# Patient Record
Sex: MALE | Race: WHITE
[De-identification: names, ages, dates, MRNs, and addresses within clinical notes are randomized per-mention and may not be internally consistent; named-entity substitution may affect disease eponyms.]

---

## 2019-08-27 ENCOUNTER — HOSPITAL ENCOUNTER (EMERGENCY)
Dept: HOSPITAL 97 - ER | Age: 69
Discharge: HOME | End: 2019-08-27
Payer: COMMERCIAL

## 2019-08-27 DIAGNOSIS — Z23: ICD-10-CM

## 2019-08-27 DIAGNOSIS — Z85.028: ICD-10-CM
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
## 2019-08-27 DIAGNOSIS — S81.811A: Primary | ICD-10-CM
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
## 2019-08-27 DIAGNOSIS — I10: ICD-10-CM

## 2019-08-27 PROCEDURE — 90471 IMMUNIZATION ADMIN: CPT

## 2019-08-27 PROCEDURE — 0JQN0ZZ REPAIR RIGHT LOWER LEG SUBCUTANEOUS TISSUE AND FASCIA, OPEN APPROACH: ICD-10-PCS

## 2019-08-27 PROCEDURE — 90714 TD VACC NO PRESV 7 YRS+ IM: CPT

## 2019-08-27 PROCEDURE — 99284 EMERGENCY DEPT VISIT MOD MDM: CPT

## 2019-08-27 NOTE — EDPHYS
Physician Documentation                                                                           

 [facility]                                                                 

Name: Fabio Matute Jr                                                                                

Age: 69 yrs                                                                                       

Sex: Male                                                                                         

: 1950                                                                                   

MRN: [id number]                                                                                   

Arrival Date: 2019                                                                          

Time: 12:17                                                                                       

Account#: [id number]                                                                            

Bed Treatment                                                                                     

Private MD: Laz Odonnell B                                                                     

ED Physician Chapin Carver                                                                      

HPI:                                                                                              

                                                                                             

13:37 This 69 yrs old  Male presents to ER via Wheelchair with complaints of         malissa 

      Laceration To Leg.                                                                          

13:37 The patient has a laceration related to: handling garbage, from a sharp metal object,   malissa 

      occurred at home. The laceration(s) is(are) located on the lateral aspect of right          

      calf. Onset: The symptoms/episode began/occurred just prior to arrival. The patient has     

      not experienced similar symptoms in the past.                                               

                                                                                                  

Historical:                                                                                       

- Allergies:                                                                                      

12:21 No Known Allergies;                                                                     la1 

- PMHx:                                                                                           

12:21 Arthritis; Hypertension; stomach cancer;                                                la1 

                                                                                                  

- Immunization history:: Adult Immunizations up to date.                                          

- Social history:: Smoking status: Patient/guardian denies using tobacco.                         

- Ebola Screening: : No symptoms or risks identified at this time.                                

- Family history:: not pertinent.                                                                 

                                                                                                  

                                                                                                  

ROS:                                                                                              

13:37 Constitutional: Negative for fever, chills, and weight loss, Eyes: Negative for injury, malissa 

      pain, redness, and discharge, ENT: Negative for injury, pain, and discharge, Neck:          

      Negative for injury, pain, and swelling, Cardiovascular: Negative for chest pain,           

      palpitations, and edema, Respiratory: Negative for shortness of breath, cough,              

      wheezing, and pleuritic chest pain, Abdomen/GI: Negative for abdominal pain, nausea,        

      vomiting, diarrhea, and constipation, Back: Negative for injury and pain, : Negative      

      for injury, bleeding, discharge, and swelling, Skin: Negative for injury, rash, and         

      discoloration, Neuro: Negative for headache, weakness, numbness, tingling, and seizure,     

      Psych: Negative for depression, anxiety, suicide ideation, homicidal ideation, and          

      hallucinations, Allergy/Immunology: Negative for hives, rash, and allergies, Endocrine:     

      Negative for neck swelling, polydipsia, polyuria, polyphagia, and marked weight             

      changes, Hematologic/Lymphatic: Negative for swollen nodes, abnormal bleeding, and          

      unusual bruising.                                                                           

13:37 MS/extremity: Positive for pain, swelling, tenderness, of the lateral aspect of right       

      calf.                                                                                       

                                                                                                  

Exam:                                                                                             

13:37 Constitutional:  This is a well developed, well nourished patient who is awake, alert,  malissa 

      and in no acute distress. Head/Face:  Normocephalic, atraumatic. Eyes:  Pupils equal        

      round and reactive to light, extra-ocular motions intact.  Lids and lashes normal.          

      Conjunctiva and sclera are non-icteric and not injected.  Cornea within normal limits.      

      Periorbital areas with no swelling, redness, or edema. ENT:  Nares patent. No nasal         

      discharge, no septal abnormalities noted.  Tympanic membranes are normal and external       

      auditory canals are clear.  Oropharynx with no redness, swelling, or masses, exudates,      

      or evidence of obstruction, uvula midline.  Mucous membranes moist. Neck:  Trachea          

      midline, no thyromegaly or masses palpated, and no cervical lymphadenopathy.  Supple,       

      full range of motion without nuchal rigidity, or vertebral point tenderness.  No            

      Meningismus. Chest/axilla:  Normal chest wall appearance and motion.  Nontender with no     

      deformity.  No lesions are appreciated. Cardiovascular:  Regular rate and rhythm with a     

      normal S1 and S2.  No gallops, murmurs, or rubs.  Normal PMI, no JVD.  No pulse             

      deficits. Respiratory:  Lungs have equal breath sounds bilaterally, clear to                

      auscultation and percussion.  No rales, rhonchi or wheezes noted.  No increased work of     

      breathing, no retractions or nasal flaring. Abdomen/GI:  Soft, non-tender, with normal      

      bowel sounds.  No distension or tympany.  No guarding or rebound.  No evidence of           

      tenderness throughout. Back:  No spinal tenderness.  No costovertebral tenderness.          

      Full range of motion. Male :  Normal genitalia with no discharge or lesions. Skin:        

      Warm, dry with normal turgor.  Normal color with no rashes, no lesions, and no evidence     

      of cellulitis. Neuro:  Awake and alert, GCS 15, oriented to person, place, time, and        

      situation.  Cranial nerves II-XII grossly intact.  Motor strength 5/5 in all                

      extremities.  Sensory grossly intact.  Cerebellar exam normal.  Normal gait. Psych:         

      Awake, alert, with orientation to person, place and time.  Behavior, mood, and affect       

      are within normal limits.                                                                   

13:37 Musculoskeletal/extremity: Extremities: laceration, ROM: no acute changes, intact in        

      all extremities, full active range of motion, full passive range of motion, Circulation     

      is intact in all extremities. Pulses: are normal with no appreciated deficits,              

      Sensation intact. Compartment Syndrome exam of affected extremity: is normal.               

                                                                                                  

Vital Signs:                                                                                      

12:21  / 83; Pulse 100; Resp 16; Temp 98.4; Pulse Ox 98% on R/A; Weight 79.38 kg;       la1 

      Height 5 ft. 7 in. (170.18 cm);                                                             

12:21 Body Mass Index 27.41 (79.38 kg, 170.18 cm)                                             [facility] 

                                                                                                  

Laceration:                                                                                       

13:40 Wound Repair of 2.5cm ( 1.0in ) subcutaneous laceration to right leg. Linear shaped..   [facility] 

      Distal neuro/vascular/tendon intact. Anesthesia: Local anesthetic administered with 5       

      mls of 1% lidocaine. Wound prep: Moderate cleansing by me. Skin closed with 2 5-0           

      Prolene using vertical mattress sutures and sterile technique. Dressed with Neosporin,      

      non-adherent dressing. Patient tolerated well.                                              

                                                                                                  

MDM:                                                                                              

12:31 Patient medically screened.                                                             [facility] 

13:39 Data reviewed: vital signs, nurses notes, radiologic studies, plain films.              [facility] 

                                                                                                  

                                                                                             

12:33 Order name: Tib Fib Right XRAY                                                          [facility] 

                                                                                             

12:32 Order name: Prolene, Sutures; Complete Time: 12:32                                      [facility] 

                                                                                             

12:32 Order name: Dressing - Wound; Complete Time: 14:13                                      [facility] 

                                                                                             

12:32 Order name: Gloves, Sterile; Complete Time: 12:32                                       [facility] 

                                                                                             

12:32 Order name: Setup Suture Tray; Complete Time: 12:32                                     [facility] 

                                                                                             

12:33 Order name: Prolene, Sutures; Complete Time: 12:34                                      [facility] 

                                                                                             

12:33 Order name: Dressing - Wound; Complete Time: 12:34                                      [facility] 

                                                                                             

12:33 Order name: Gloves, Sterile; Complete Time: 12:34                                       [facility] 

                                                                                             

12:33 Order name: Setup Suture Tray; Complete Time: 12:34                                     [facility] 

                                                                                                  

Administered Medications:                                                                         

12:34 CANCELLED (Duplicate Order): Lidocaine (2 %) 5 ml 5 ml Infiltration once; to bedside    [facility] 

12:38 Drug: Tetanus-Diphtheria Toxoid Adult 0.5 ml {: [email or web address]. Exp:         la1 

      2021. Lot #: [id number]. } Route: IM; Site: left deltoid;                                  

14:13 Follow up: Response: No adverse reaction                                                iw  

13:10 Drug: Lidocaine (1 %) 5 ml Volume: 5 ml; Route: Infiltration;                           iw  

                                                                                                  

                                                                                                  

Disposition:                                                                                      

19 13:41 Discharged to Home. Impression: Laceration without foreign body, right lower       

  leg.                                                                                            

- Condition is Stable.                                                                            

- Discharge Instructions: Laceration Care, Adult, Laceration Care, Adult, Easy-to-Read.           

- Prescriptions for Keflex 500 mg Oral Capsule - take 1 capsule by ORAL route every 6             

  hours for 7 days; 28 capsule.                                                                   

- Medication Reconciliation Form, Thank You Letter, Antibiotic Education, Prescription            

  Opioid Use form.                                                                                

- Follow up: Laz Odonnell MD; When: 7 - 10 days; Reason: Recheck today's complaints,           

  Continuance of care, Re-evaluation by your physician.                                           

- Problem is new.                                                                                 

- Symptoms have improved.                                                                         

                                                                                                  

                                                                                                  

                                                                                                  

Signatures:                                                                                       

Dispatcher [employer]                           EDMS                                                 

Chapin Carver MD MD cha Williams, Irene RN                     [facility]                                                      

Jomar Shin RN                         RN   la1                                                  

                                                                                                  

Corrections: (The following items were deleted from the chart)                                    

12:34 12:33 Lidocaine (2 %) 5 ml 5 ml Infiltration once; to bedside ordered. malissa wilson 

14:14 13:41 2019 13:41 Discharged to Home. Impression: Laceration without foreign body, iw  

      right lower leg. Condition is Stable. Forms are Medication Reconciliation Form, Thank       

      You Letter, Antibiotic Education, Prescription Opioid Use. Follow up: Laz Odonnell;        

      When: 7 - 10 days; Reason: Recheck today's complaints, Continuance of care,                 

      Re-evaluation by your physician. Problem is new. Symptoms have improved. malissa                

                                                                                                  

**************************************************************************************************

## 2019-08-27 NOTE — RAD REPORT
EXAM DESCRIPTION:  RAD - Tib Fib Right - 8/27/2019 1:20 pm

 

CLINICAL HISTORY:  Right leg pain, trauma, laceration

 

COMPARISON:  None.

 

FINDINGS:  No fracture is identified. There is no dislocation or periosteal reaction noted. No acute 
or suspicious bony finding. Postsurgical changes are present. Hardware is in place in the medial comp
artment of the knee. No radiographic evidence for loosening.

 

At the laceration site anterior mid tibial level there is soft tissue swelling or edema present. No f
oreign body is present.

 

IMPRESSION:  No foreign body at the site of laceration.

 

No acute bone or joint finding.

## 2019-08-27 NOTE — ER
Nurse's Notes                                                                                     

 [facility]                                                                 

Name: Fabio Matute Jr                                                                                

Age: 69 yrs                                                                                       

Sex: Male                                                                                         

: 1950                                                                                   

MRN: [id number]                                                                                   

Arrival Date: 2019                                                                          

Time: 12:17                                                                                       

Account#: [id number]                                                                            

Bed Treatment                                                                                     

Private MD: Laz Odonnell B                                                                     

Diagnosis: Laceration without foreign body, right lower leg                                       

                                                                                                  

Presentation:                                                                                     

                                                                                             

12:20 Presenting complaint: Patient states: I was carrying stuff and some stuff in a black    la1 

      trash bag and it cut my right lower leg. Pt reports small laceration, bleeding              

      controlled. Transition of care: patient was not received from another setting of care.      

      Complicating Factors: There are no complicating factors for this patient. Onset of          

      symptoms was 2019. Risk Assessment: Do you want to hurt yourself or someone      

      else? Patient reports no desire to harm self or others. Initial Sepsis Screen: Does the     

      patient meet any 2 criteria? No. Patient's initial sepsis screen is negative. Does the      

      patient have a suspected source of infection? No. Patient's initial sepsis screen is        

      negative. Care prior to arrival: None.                                                      

12:20 Method Of Arrival: Wheelchair                                                           la1 

12:20 Acuity: RIKI 4                                                                           la1 

                                                                                                  

Historical:                                                                                       

- Allergies:                                                                                      

12:21 No Known Allergies;                                                                     la1 

- PMHx:                                                                                           

12:21 Arthritis; Hypertension; stomach cancer;                                                la1 

                                                                                                  

- Immunization history:: Adult Immunizations up to date.                                          

- Social history:: Smoking status: Patient/guardian denies using tobacco.                         

- Ebola Screening: : No symptoms or risks identified at this time.                                

- Family history:: not pertinent.                                                                 

                                                                                                  

                                                                                                  

Screenin:31 Abuse screen: Denies threats or abuse. Nutritional screening: On. Tuberculosis          la1 

      screening: No symptoms or risk factors identified. Fall Risk None identified.               

                                                                                                  

Assessment:                                                                                       

12:31 General: Appears in no apparent distress. Behavior is calm, cooperative. Pain:          la1 

      Complains of pain in right shin. Neuro: Level of Consciousness is awake, alert, obeys       

      commands, Oriented to person, place, time, situation. Cardiovascular: Capillary refill      

      < 3 seconds Patient's skin is warm and dry. Respiratory: Airway is patent Respiratory       

      effort is even, unlabored. Musculoskeletal: Circulation, motion, and sensation intact.      

      Capillary refill < 3 seconds, Range of motion: intact in all extremities. Injury            

      Description: Laceration is 0.5 to 2.5 cm long, not bleeding, was sustained 1-2 hours        

      ago.                                                                                        

                                                                                                  

Vital Signs:                                                                                      

12:21  / 83; Pulse 100; Resp 16; Temp 98.4; Pulse Ox 98% on R/A; Weight 79.38 kg;       la1 

      Height 5 ft. 7 in. (170.18 cm);                                                             

12:21 Body Mass Index 27.41 (79.38 kg, 170.18 cm)                                             la1 

                                                                                                  

ED Course:                                                                                        

12:17 Patient arrived in ED.                                                                  mr  

12:17 Laz Odonnell MD is Private Physician.                                                mr  

12:21 Triage completed.                                                                       la1 

12:22 Arm band placed on right wrist.                                                         la1 

12:31 Chapin Carver MD is Attending Physician.                                             [facility] 

12:31 Jomar Shin, ARLETTE is Primary Nurse.                                                       la1 

12:31 Call light in reach.                                                                    la1 

13:21 Tib Fib Right XRAY In Process Unspecified.                                              EDMS

13:41 Laz Odonnell MD is Referral Physician.                                               [facility] 

14:00 Assist provider with laceration repair on right shin that was between 2.6 to 7.5 cm     iw  

      using sutures. Set up tray. Performed by Chapin Carver MD Dressed with band aid,           

      Neosporin, Patient tolerated well. Patient did not have IV access during this emergency     

      room visit.                                                                                 

                                                                                                  

Administered Medications:                                                                         

12:34 CANCELLED (Duplicate Order): Lidocaine (2 %) 5 ml 5 ml Infiltration once; to bedside    la1 

12:38 Drug: Tetanus-Diphtheria Toxoid Adult 0.5 ml {: [employer]. Exp:         2021. Lot #: [id number]. } Route: IM; Site: left deltoid;                                  

14:13 Follow up: Response: No adverse reaction                                                iw  

13:10 Drug: Lidocaine (1 %) 5 ml Volume: 5 ml; Route: Infiltration;                           iw  

                                                                                                  

                                                                                                  

Outcome:                                                                                          

13:41 Discharge ordered by MD.                                                                [facility] 

14:14 Discharged to home ambulatory, with family.                                             iw  

14:14 Condition: good                                                                             

14:14 Discharge instructions given to patient, family, Instructed on discharge instructions,      

      follow up and referral plans. medication usage, Demonstrated understanding of               

      instructions, follow-up care, medications, Prescriptions given X 1.                         

14:14 Patient left the ED.                                                                    iw  

                                                                                                  

Signatures:                                                                                       

Dispatcher [employer]                           EDMS                                                 

Chapin Carver MD MD cha Rivera, Mary mr Williams, Irene RN                     RN   luke Hastingsa, Jomar, RN                         RN   la1                                                  

                                                                                                  

**************************************************************************************************

## 2019-08-27 NOTE — XMS REPORT
Clinical Summary

 Created on:2019



Patient:Fabio Matute

Sex:Male

:1950

External Reference #:[id number]





Demographics







 Address  [address] [place] [address]

 

 Mobile Phone  [phone]

 

 Home Phone  [phone]

 

 Email Address  [email or web address]

 

 Preferred Language  English

 

 Marital Status  

 

 Uatsdin Affiliation  Unknown

 

 Race  White

 

 Ethnic Group  Not  or 









Author







 Organization  [facility]

 

 Address  [address] [place] [address]









Support







 Name  Relationship  Address  Phone

 

 Shanique Matute  Spouse  Unavailable  [phone]









Care Team Providers







 Name  Role  Phone

 

 Rene Odonnell MD  Primary Care Provider  [phone]









Allergies

No Known Allergies



Medications







 Medication  Sig  Dispensed  Refills  Start  End Date  Status



         Date    

 

 rosuvastatin (CRESTOR)  Take 5 mg    99      Active



 5 MG tablet  by mouth      6    



   once daily.          

 

 hydroCHLOROthiazide  TAKE 1/2 TO    0      Active



 (HYDRODIURIL) 25 MG  1 TABLET BY      7    



 tablet  MOUTH EVERY          



   OTHER DAY          

 

 celecoxib (CeleBREX)  Take 200 mg    3      Active



 200 MG capsule  by mouth      6    



   once daily.          

 

 amlodipine-olmesartan  TAKE 1    3      Active



 (HAILY) 10-40 mg per  TABLET      6    



 tablet  EVERY DAY          

 

 aspirin (ECOTRIN) 81 MG  Take 81 mg    0      Active



 enteric coated tablet  by mouth          



   daily.          

 

 MAG-G 27 mg (500 mg)  Take 500 mg    3      Active



 tablet tablet  by mouth      8    



   daily.          

 

 levomefolate calcium  Take by    0      Active



 (L-METHYLFOLATE ORAL)  mouth          



   daily.          

 

 MULTIVITAMIN ORAL  Take by    0      Active



   mouth.          

 

 vit  Take by    0      Active



 C/E/Zn/coppr/lutein/sultana  mouth.          



 chinyere (PRESERVISION            



 AREDS-2 ORAL)            

 

 cyanocobalamin-cobamami  Place under    0      Active



 de (B-12 PLUS)  the tongue.          



 5,000-100 mcg tablet,            



 sublingual            

 

 memantine (NAMENDA) 10  Take 1  180 tablet  3  15/20  Active



 MG tablet  tablet (10      9  20  



   mg total)          



   by mouth 2          



   (two) times          



   a day.          

 

 DULoxetine 40 mg  Take 1  90 capsule  3      Active



 capsule,delayed  capsule by      9    



 release(DR/EC)  mouth          



   daily.          

 

 VIAGRA 100 mg tablet  TAKE 1    3  20  Discontinued



   TABLET      6  19  



   EVERY DAY          



   AS NEEDED          

 

 DULoxetine (CYMBALTA)  Take 20 mg    4  20  Discontinued



 20 MG capsule  by mouth      8  19  (Reorder)



   daily.          

 

 donepezil (ARICEPT) 5  Take 1  30 tablet  0  20  Discontinued



 MG tabletIndications:  tablet (5      8  19  (Side effects)



 Mild cognitive  mg total)          



 impairment with memory  by mouth          



 loss  nightly.          

 

 donepezil (ARICEPT) 10  Take 1  30 tablet  4  09/21/201  10/19/20  Discontinued



 MG tabletIndications:  tablet (10      8  18  (Reorder)



 Mild cognitive  mg total)          



 impairment with memory  by mouth          



 loss  nightly.          

 

 donepezil (ARICEPT) 10  Take 1  90 tablet  2  10/19/201  01/16/20  Discontinued



 MG tabletIndications:  tablet (10      8  19  (Side effects)



 Mild cognitive  mg total)          



 impairment with memory  by mouth          



 loss  nightly.          

 

 memantine (NAMENDA) 5  Wk 1: 5 mg  84 tablet  0  20  
Discontinued



 MG tablet  at night      9  19  (Dose



   W2: 5 mg          adjustment)



   twice daily          



   W3:10 mg am          



   5 mg night          

 

 memantine (NAMENDA) 10  Take 1  60 tablet  3  20  Discontinued



 MG tablet  tablet (10      9  19  (Dose



   mg total)          adjustment)



   by mouth 2          



   (two) times          



   a day.          

 

 DULoxetine 40 mg  Take 40 mg  90 capsule  0  01/16/201  04/10/20  Discontinued



 capsule,delayed  by mouth      9  19  (Reorder)



 release(DR/EC)  daily.          

 

 memantine (NAMENDA) 10  Take 1  180 tablet  1  20  
Discontinued



 MG tablet  tablet (10      9  19  (Reorder)



   mg total)          



   by mouth 2          



   (two) times          



   a day.          

 

 DULoxetine 40 mg  TAKE ONE  90 capsule  0  04/10/201  04/16/20  Discontinued



 capsule,delayed  CAPSULE BY      9  19  (Reorder)



 release(DR/EC)  MOUTH EVERY          



   DAY          







Active Problems







 Problem  Noted Date

 

 Mild cognitive impairment with memory loss  2019

 

 Coronary artery disease involving native coronary artery of native heart  



 without angina pectoris  

 

 Bilateral carotid bruits  2018

 

 Memory loss or impairment  2017

 

 Nonrheumatic aortic valve insufficiency  2017

 

 Essential hypertension  2017

 

 Hyperlipidemia  2017







Encounters







 Date  Type  Specialty  Care Team  Description

 

 2019  Office Visit  Neurology  Shawn Raphael MD  Mild cognitive impairment



         with memory loss (Primary Dx)

 

 04/10/2019  Refill  Neurology  Shawn Raphael MD  

 

 2019  Refill  Neurology  Jemma Flores MA  

 

 2019  Office Visit  Cardiology  Graham Stapleton MD  Coronary artery 
disease involving native coronary artery of native heart without angina 
pectoris (Primary Dx);



         Other hyperlipidemia

 

 2019  Office Visit  Neurology  Shawn Raphael MD  Mild cognitive impairment



         with memory loss (Primary Dx)

 

 2018  Refill  Neurology  Shawn Raphael MD  Mild cognitive impairment



         with memory loss

 

 10/19/2018  Refill  Neurology  Jemma Flores Mild cognitive impairment



       MA  with memory loss

 

 2018  Office Visit  Neurology  Shawn Raphael MD  Mild cognitive 
impairment with memory loss (Primary Dx);



         B12 deficiency

 

 2018  Orders Only  Neurology  Jemma Flores  Memory loss or 
impairment



       MA  

 

 2018  Orders Only  Neurology  Yuliya Bautista MA  Memory loss or 
impairment



after 2018



Social History







 Tobacco Use  Types  Packs/Day  Years Used  Date

 

 Former Smoker        









 Smokeless Tobacco: Former User      









 Alcohol Use  Drinks/Week  oz/Week  Comments

 

 Yes      









 Sex Assigned at Birth  Date Recorded

 

 Not on file  









 Job Start Date  Occupation  Industry

 

 Not on file  Not on file  Not on file









 Travel History  Travel Start  Travel End









 No recent travel history available.







Last Filed Vital Signs







 Vital Sign  Reading  Time Taken  Comments

 

 Blood Pressure  128/66  2019  1:51 PM CDT  

 

 Pulse  77  2019  1:51 PM CDT  

 

 Temperature  -  -  

 

 Respiratory Rate  -  -  

 

 Oxygen Saturation  -  -  

 

 Inhaled Oxygen Concentration  -  -  

 

 Weight  83.5 kg (184 lb)  2019  1:51 PM CDT  

 

 Height  170.2 cm (5' 7")  2019  1:51 PM CDT  

 

 Body Mass Index  28.82  2019  1:51 PM CDT  







Plan of Treatment







 Date  Type  Specialty  Care Team  Description

 

 2019  Office Visit  Neurology  Shawn Raphael MD



  



       [address] [address] [address]



  



       [phone] (Fax)  

 

 2020  Office Visit  Cardiology  Graham Stapleton MD



  



       [address] [facility]



  



       [address] [place] [address] [phone] (Fax)  









 Health Maintenance  Due Date  Last Done  Comments

 

 COLONOSCOPY SCREENING  2000    

 

 SHINGLES VACCINES (#1)  2000    

 

 65+ PNEUMOCOCCAL VACCINE (1 of 2 - PCV13)  2015    

 

 INFLUENZA VACCINE  2019  11/15/2018  







Procedures







 Procedure Name  Priority  Date/Time  Associated Diagnosis  Comments

 

 COPY RECEIVED FROM:  Routine  2019  9:20    Results for this



     AM CST    procedure are in



         the results



         section.

 

 NON HDL CHOLESTEROL  Routine  2019  9:20    Results for this



 ([facility]LEX [facility])    AM CST    procedure are in



         the results



         section.

 

 CHOL/HDLC RATIO  Routine  2019  9:20    Results for this



 ([facility])    AM CST    procedure are in



         the results



         section.

 

 LDL-CHOLESTEROL  Routine  2019  9:20    Results for this



 ([facility])    AM CST    procedure are in



         the results



         section.

 

 TRIGLYCERIDES  Routine  2019  9:20    Results for this



     AM CST    procedure are in



         the results



         section.

 

 HDL CHOLESTEROL  Routine  2019  9:20    Results for this



     AM CST    procedure are in



         the results



         section.

 

 CHOLESTEROL  Routine  2019  9:20    Results for this



     AM CST    procedure are in



         the results



         section.

 

 COPY(IES) SENT TO:  Routine  2019  9:20    Results for this



     AM CST    procedure are in



         the results



         section.

 

 AST (SGOT)  Routine  2019  9:20  Coronary artery  Results for this



     AM CST  disease involving  procedure are in



       native coronary artery  the results



       of native heart  section.



       without angina  



       pectoris



  



       Other hyperlipidemia  

 

 LIPID PANEL  Routine  2019  9:20  Coronary artery  Results for this



     AM CST  disease involving  procedure are in



       native coronary artery  the results



       of native heart  section.



       without angina  



       pectoris



  



       Other hyperlipidemia  

 

 ECG 12-LEAD  Routine  2019 11:36  Coronary artery  Results for this



     AM CST  disease involving  procedure are in



       native coronary artery  the results



       of native heart  section.



       without angina  



       pectoris  

 

 VITAMIN B1 LEVEL,  Routine  2018 11:13  Biotin-(propionyl-CoA-  Results 
for this



 WHOLE BLOOD    AM CDT  carboxylase) ligase  procedure are in



       deficiency  the results



         section.

 

 VITAMIN B12 LEVEL  Routine  2018 11:13  Biotin-(propionyl-CoA-  Results 
for this



     AM CDT  carboxylase) ligase  procedure are in



       deficiency  the results



         section.



after 2018



Results

NON HDL CHOLESTEROL ([facility]) (2019  9:20 AM CST)





 Component  Value  Ref Range  Performed At  Pathologist



         Signature

 

 Non-HDL cholesterol  129  <130 mg/dL  [facility]  



   Comment:  (calc)  [place]  



   For patients with diabetes plus 1 major ASCVD risk      



   factor, treating to a non-HDL-C goal of <100 mg/dL      



   (LDL-C of <70 mg/dL) is considered a therapeutic      



   option.      



         









 Specimen

 

 









 Narrative  Performed At

 

 FASTING:YES



  [facility]



 FASTING: YES  









 Resulting Agency Comment

 

 Performing Organization Information:







 Site ID: [facility]







 Name: [facility]







 Address: [address] [place] [address]







 Director: Neris Sierra









 Performing Organization  Address  City/State/[facility] Number

 

 [place] [address]  [phone]



CHOL/HDLC RATIO (REFLEX [facility]) (2019  9:20 AM CST)





 Component  Value  Ref Range  Performed At  Pathologist Signature

 

 Cholesterol/HDL ratio  3.6  <5.0 (calc)  [facility]  



       [place]  









 Specimen

 

 









 Narrative  Performed At

 

 FASTING:YES



  [facility]



 FASTING: YES  









 Resulting Agency Comment

 

 Performing Organization Information:







 Site ID: [facility]







 Name: [facility]







 Address: [address] [place] [address]







 Director: Neris Sierra









 Performing Organization  Address  City/[facility]  Phone Number

 

 [place] [address]  [phone]



COPY RECEIVED FROM: (2019  9:20 AM CST)





 Component  Value  Ref Range  Performed At  Pathologist Signature

 

 Copy received from:      [facility]  



   Comment:      



         



   [facility] [address]      



   [address] [facility] [id number]      



   [place] [address]      



         









 Specimen

 

 









 Narrative  Performed At

 

 FASTING:YES



  [facility]



 FASTING: YES  









 Performing Organization  Address  City/[facility]  Phone Number

 

 [facility]      



LDL-CHOLESTEROL ([facility]LEX [facility]) (2019  9:20 AM CST)





 Component  Value  Ref Range  Performed At  Pathologist



         Signature

 

 LDL cholesterol  106 (H)  mg/dL (calc)  [facility]  



 calculated  Comment:    [place]  



   Reference range: <100      



         



   Desirable range <100 mg/dL for primary prevention;      



   <70 mg/dL for patients with CHD or diabetic patients      



   with > or=2 CHD risk factors.      



         



   LDL-C is now calculated using the Claudette      



   calculation, which is a validated novel method providing      



   better accuracy than the Friedewald equation in the      



   estimation of LDL-C.      



   Ihsan SS et al. EMIL. 2013;310(22): 0009-9968      



   (http://education.Visionary Mobile/faq/WIL073)      



         









 Specimen

 

 









 Narrative  Performed At

 

 FASTING:YES



  [facility]



 FASTING: YES  









 Resulting Agency Comment

 

 Performing Organization Information:







 Site ID: [facility]







 Name: [facility]







 Address: [address] [place] [address]







 Director: Neris Sierra









 Performing Organization  Address  City/[facility]  Phone Number

 

 [place] [address]  [phone]



COPY(IES) SENT TO: (2019  9:20 AM CST)





 Component  Value  Ref Range  Performed At  Pathologist Signature

 

 Copies/mL      [facility]  



   Comment:      



         



   [facility] [address]      



   [address] [facility] [address]      



   [place] [address]      



         









 Specimen

 

 









 Narrative  Performed At

 

 FASTING:YES



  [facility]



 FASTING: YES  









 Performing Organization  Address  City/[facility]  Phone Number

 

 [facility]      



Triglycerides (2019  9:20 AM CST)





 Component  Value  Ref Range  Performed At  Pathologist Signature

 

 Triglycerides  129  <150 mg/dL  [facility]  









 Specimen

 

 









 Narrative  Performed At

 

 FASTING:YES



  [facility]



 FASTING: YES  









 Resulting Agency Comment

 

 Performing Organization Information:







 Site ID: [facility]







 Name: [facility]







 Address: [address] [place] [address]







 Director: Neris Sierra









 Performing Organization  Address  City/State/[facility]  Phone Number

 

 [place] [address]  [phone]



AST (SGOT) (2019  9:20 AM CST)





 Component  Value  Ref Range  Performed At  Pathologist Signature

 

 AST  19  10 - 35 U/L  [facility]  









 Specimen

 

 Blood









 Narrative  Performed At

 

 FASTING:YES



  [facility]



 FASTING: YES  









 Resulting Agency Comment

 

 Performing Organization Information:







 Site ID: [facility]







 Name: [facility]







 Address: [address] [place] [address]







 Director: Neris Sierra









 Performing Organization  Address  City/State/[facility]  Phone Number

 

 [place] [address]  [phone]



HDL cholesterol (2019  9:20 AM CST)





 Component  Value  Ref Range  Performed At  Pathologist Signature

 

 HDL cholesterol  50  >40 mg/dL  [facility]  









 Specimen

 

 









 Narrative  Performed At

 

 FASTING:YES



  [facility]



 FASTING: YES  









 Resulting Agency Comment

 

 Performing Organization Information:







 Site ID: [id number]







 Name: [facility]







 Address: [address] [place], [place] [id number]







 Director: Neris Sierra









 Performing Organization  Address  City/[facility]  Phone Number

 

 [facility]      

 

 [place] [address]  [phone]



Cholesterol (2019  9:20 AM CST)





 Component  Value  Ref Range  Performed At  Pathologist Signature

 

 Cholesterol, total  179  <200 mg/dL  [facility]  









 Specimen

 

 









 Narrative  Performed At

 

 FASTING:YES



  [facility]



 FASTING: YES  









 Resulting Agency Comment

 

 Performing Organization Information:







 Site ID: [facility]







 Name: [facility]







 Address: [address] [place] [address]







 Director: Neris Sierra









 Performing Organization  Address  City/[facility]  Phone Number

 

 [facility]      

 

 [place] [address]  [phone]



Lipid panel (2019  9:20 AM CST)





 Component  Value  Ref Range  Performed At  Pathologist



         Signature

 

 Cholesterol, total  179  <200 mg/dL  [facility]  

 

 HDL cholesterol  50  >40 mg/dL  [facility]  

 

 Triglycerides  129  <150 mg/dL  [facility]  

 

 LDL cholesterol  106 (H)  mg/dL (calc)  [facility]  



 calculated  Comment:    [facility]  



   Reference range: <100      



         



   Desirable range <100 mg/dL for primary prevention;      



   <70 mg/dL for patients with CHD or diabetic patients      



   with > or=2 CHD risk factors.      



         



   LDL-C is now calculated using the Claudette      



   calculation, which is a validated novel method providing      



   better accuracy than the Friedewald equation in the      



   estimation of LDL-C.      



   Ihsan GOYAL et al. EMIL. 2013;310(19): 3634-6559      



   (http://education.Off[employer] VISION.PocketGuide/faq/GKU152)      



         

 

 Cholesterol/HDL  3.6  <5.0 (calc)  [facility]  



 [facility]  

 

 Non-HDL cholesterol  129  <130 mg/dL  [facility]  



   Comment:  (calc)  [facility]  



   For patients with diabetes plus 1 major ASCVD risk      



   factor, treating to a non-HDL-C goal of <100 mg/dL      



   (LDL-C of <70 mg/dL) is considered a therapeutic      



   option.      



         









 Specimen

 

 Blood









 Narrative  Performed At

 

 FASTING:YES



  [facility]



 FASTING: YES  









 Resulting Agency Comment

 

 Performing Organization Information:







 Site ID: [facility]







 Name: [facility]







 Address: [address] [place] [address]







 Director: Neris Sierra









 Performing Organization  Address  City/State/Zipcode  Phone Number

 

 [facility]      

 

 [facility]  [address] [place], [place] [id number] [phone]



ECG 12 lead (2019 11:36 AM CST)





 Component  Value  Ref Range  Performed At  Pathologist Signature

 

 Ventricular rate  60    [facility]  

 

 Atrial rate  60    [facility]  

 

 NH interval  154    [facility]  

 

 QRSD interval  84    [facility]  

 

 QT interval  382    [facility]  

 

 QTC interval  382    [facility]  

 

 P axis 1  71    [facility]  

 

 QRS axis 1  11    [facility]  

 

 T wave axis  42    [facility]  

 

 EKG impression  Normal sinus    [facility]  



   rhythm-Normal ECG-In      



   automated comparison      



   with ECG of 2018      



   09:37,-No significant      



   change was      



   found-Electronically      



   Signed By Doc Garcia MD ([id number]) on      



   2019 8:26:12 PM      









 Specimen

 

 









 Narrative  Performed At

 

 This result has an attachment that is not available.



  









 Performing Organization  Address  City/[facility]  Phone Number

 

 [facility]  [address] [place] [address]  



Vitamin B1 level, whole blood (2018 11:13 AM CDT)





 Component  Value  Ref Range  Performed At  [facility]

 

 Vitamin B1  112  70 - 180  [facility]  



   Comment:  nmol/L    



   INTERPRETIVE INFORMATION: Vitamin B1, Whole Blood      



   This assay measures the concentration of thiamine diphosphate      



   (TDP), the primary active form of vitamin B1. Approximately 90      



   percent of vitamin B1 present in whole blood is TDP. Thiamine and      



   thiamine monophosphate, which comprise the remaining 10 percent,      



   are not measured.      



   Test developed and characteristics determined by [facility]. See Compliance Statement B: [employer].PocketGuide/CS      



   Performed by [facility],
      



   [address] [place] [address] [phone]
      



   [email or web address], Roberto Andrade MD - Lab. Director      



         









 Specimen

 

 Plasma specimen









 Performing Organization  Address  City/[facility]/Zipcode  Phone Number

 

 [facility]  [address] [place] [address]  



Vitamin B12 level (2018 11:13 AM CDT)





 Component  Value  Ref Range  Performed At  Pathologist



         Signature

 

 Vitamin B12  1,066 (I) 164 - 203  [facility] DEPARTMENT OF  



   Comment:  pg/mL  PATHOLOGY AND  



   Significant overlap exists between normal and deficiency states.    GENOMIC 
MEDICINE  



   However, most patients with deficiencies will have Serum B12      



   <200 pg/mL.
      



         









 Specimen

 

 Serum









 Performing Organization  Address  City/State/Zipcode  Phone Number

 

 [facility] DEPARTMENT OF PATHOLOGY AND  [address] [place] [address]  



 GENOMIC MEDICINE      



after 2018



Insurance







 Payer  Benefit Plan / Group  Subscriber ID  Effective Dates  Phone  Address  
Type

 

 [employer] MEDICARE  [employer] MEDICARE  xxxxxxxx  2017-Present      HMO



   HMO/O [facility]          









 Guarantor Name  Account Type  Relation to  Date of  Phone  Billing Address



     Patient  Birth    

 

 Fabio Matute  Personal/Family  Self  1950  [phone]  [address]







         ([place])  [place] [address]







Advance Directives

For more information, please contact: [phone]





 Type  Date Recorded  Patient Representative  Explanation

 

 Advance Directives, Living Will and      



 Medical Power of

## 2019-09-06 ENCOUNTER — HOSPITAL ENCOUNTER (EMERGENCY)
Dept: HOSPITAL 97 - ER | Age: 69
Discharge: HOME | End: 2019-09-06
Payer: COMMERCIAL

## 2019-09-06 DIAGNOSIS — Z48.02: Primary | ICD-10-CM

## 2019-09-06 PROCEDURE — 99281 EMR DPT VST MAYX REQ PHY/QHP: CPT

## 2019-09-06 NOTE — ER
Nurse's Notes                                                                                     

 [facility]                                                                 

Name: Fabio Matute Jr                                                                                

Age: 69 yrs                                                                                       

Sex: Male                                                                                         

: 1950                                                                                   

MRN: [id number]                                                                                   

Arrival Date: 2019                                                                          

Time: 09:35                                                                                       

Account#: [id number]                                                                            

Bed 12                                                                                            

Private MD: Laz Odonnell B                                                                     

Diagnosis: Encounter for removal of sutures                                                       

                                                                                                  

Presentation:                                                                                     

                                                                                             

09:40 Transition of care: patient was not received from another setting of care. Onset of     iw  

      symptoms was 2019. Risk Assessment: Do you want to hurt yourself or           

      someone else? Patient reports no desire to harm self or others. Initial Sepsis Screen:      

      Does the patient meet any 2 criteria? No. Patient's initial sepsis screen is negative.      

      Does the patient have a suspected source of infection? No. Patient's initial sepsis         

      screen is negative. Care prior to arrival: None.                                            

09:40 Acuity: RIKI 4                                                                           iw  

09:40 Method Of Arrival: Ambulatory                                                           iw  

                                                                                                  

Historical:                                                                                       

- Allergies:                                                                                      

09:41 No Known Allergies;                                                                     iw  

                                                                                                  

                                                                                                  

                                                                                                  

Vital Signs:                                                                                      

09:40  / 86; Pulse 67; Resp 16; Temp 97.6; Pulse Ox 94% on R/A; Weight 81.65 kg; Height iw  

      5 ft. 7 in. (170.18 cm);                                                                    

09:40 Body Mass Index 28.19 (81.65 kg, 170.18 cm)                                             iw  

                                                                                                  

ED Course:                                                                                        

09:35 Patient arrived in ED.                                                                  as  

09:35 Laz Odonnell MD is Private Physician.                                                as  

09:37 Donna Philip, RN is Primary Nurse.                                                   iw  

09:37 Chapin Carver MD is Attending Physician.                                             [facility] 

09:37 Av[facility] Jeff [facility]C is [facility]                                                        snw 

09:40 Triage completed.                                                                       iw  

09:54 Laz Odonnell MD is Referral Physician.                                               snw 

10:05 Arm band placed on.                                                                     iw  

                                                                                                  

Administered Medications:                                                                         

No medications were administered                                                                  

                                                                                                  

                                                                                                  

Outcome:                                                                                          

09:54 Discharge ordered by MD.                                                                snw 

10:05 Patient left the ED.                                                                    iw  

                                                                                                  

Signatures:                                                                                       

Chapin Carver MD MD cha Therrien, Shelly, FNP-C FNP-CsnJayne Warner                             as                                                   

Donna Philip, RN                     RN   iw                                                   

                                                                                                  

**************************************************************************************************

## 2019-09-06 NOTE — XMS REPORT
Clinical Summary

 Created on:2019



Patient:Fabio Matute

Sex:Male

:1950

External Reference #:[id number]





Demographics







 Address  [address] [place] [address]

 

 Mobile Phone  [phone]

 

 Home Phone  [phone]

 

 Email Address  [email or web address]

 

 Preferred Language  English

 

 Marital Status  

 

 Restorationist Affiliation  Unknown

 

 Race  White

 

 Ethnic Group  Not  or 









Author







 Organization  [facility]

 

 Address  [address] [place] [address]









Support







 Name  Relationship  Address  Phone

 

 Shaniuqe Matute  Spouse  Unavailable  [phone]









Care Team Providers







 Name  Role  Phone

 

 Rene Odonnell MD  Primary Care Provider  [phone]









Allergies

No Known Allergies



Medications







 Medication  Sig  Dispensed  Refills  Start  End Date  Status



         Date    

 

 rosuvastatin (CRESTOR)  Take 5 mg    99      Active



 5 MG tablet  by mouth      6    



   once daily.          

 

 hydroCHLOROthiazide  TAKE 1/2 TO    0      Active



 (HYDRODIURIL) 25 MG  1 TABLET BY      7    



 tablet  MOUTH EVERY          



   OTHER DAY          

 

 celecoxib (CeleBREX)  Take 200 mg    3      Active



 200 MG capsule  by mouth      6    



   once daily.          

 

 amlodipine-olmesartan  TAKE 1    3      Active



 (HAILY) 10-40 mg per  TABLET      6    



 tablet  EVERY DAY          

 

 aspirin (ECOTRIN) 81 MG  Take 81 mg    0      Active



 enteric coated tablet  by mouth          



   daily.          

 

 MAG-G 27 mg (500 mg)  Take 500 mg    3      Active



 tablet tablet  by mouth      8    



   daily.          

 

 levomefolate calcium  Take by    0      Active



 (L-METHYLFOLATE ORAL)  mouth          



   daily.          

 

 MULTIVITAMIN ORAL  Take by    0      Active



   mouth.          

 

 vit  Take by    0      Active



 C/E/Zn/coppr/lutein/sultana  mouth.          



 chinyere (PRESERVISION            



 AREDS-2 ORAL)            

 

 cyanocobalamin-cobamami  Place under    0      Active



 de (B-12 PLUS)  the tongue.          



 5,000-100 mcg tablet,            



 sublingual            

 

 memantine (NAMENDA) 10  Take 1  180 tablet  3  15/20  Active



 MG tablet  tablet (10      9  20  



   mg total)          



   by mouth 2          



   (two) times          



   a day.          

 

 DULoxetine 40 mg  Take 1  90 capsule  3      Active



 capsule,delayed  capsule by      9    



 release(DR/EC)  mouth          



   daily.          

 

 VIAGRA 100 mg tablet  TAKE 1    3  20  Discontinued



   TABLET      6  19  



   EVERY DAY          



   AS NEEDED          

 

 DULoxetine (CYMBALTA)  Take 20 mg    4  20  Discontinued



 20 MG capsule  by mouth      8  19  (Reorder)



   daily.          

 

 donepezil (ARICEPT) 5  Take 1  30 tablet  0  20  Discontinued



 MG tabletIndications:  tablet (5      8  19  (Side effects)



 Mild cognitive  mg total)          



 impairment with memory  by mouth          



 loss  nightly.          

 

 donepezil (ARICEPT) 10  Take 1  30 tablet  4  09/21/201  10/19/20  Discontinued



 MG tabletIndications:  tablet (10      8  18  (Reorder)



 Mild cognitive  mg total)          



 impairment with memory  by mouth          



 loss  nightly.          

 

 donepezil (ARICEPT) 10  Take 1  90 tablet  2  10/19/201  01/16/20  Discontinued



 MG tabletIndications:  tablet (10      8  19  (Side effects)



 Mild cognitive  mg total)          



 impairment with memory  by mouth          



 loss  nightly.          

 

 memantine (NAMENDA) 5  Wk 1: 5 mg  84 tablet  0  20  
Discontinued



 MG tablet  at night      9  19  (Dose



   W2: 5 mg          adjustment)



   twice daily          



   W3:10 mg am          



   5 mg night          

 

 memantine (NAMENDA) 10  Take 1  60 tablet  3  20  Discontinued



 MG tablet  tablet (10      9  19  (Dose



   mg total)          adjustment)



   by mouth 2          



   (two) times          



   a day.          

 

 DULoxetine 40 mg  Take 40 mg  90 capsule  0  01/16/201  04/10/20  Discontinued



 capsule,delayed  by mouth      9  19  (Reorder)



 release(DR/EC)  daily.          

 

 memantine (NAMENDA) 10  Take 1  180 tablet  1  20  
Discontinued



 MG tablet  tablet (10      9  19  (Reorder)



   mg total)          



   by mouth 2          



   (two) times          



   a day.          

 

 DULoxetine 40 mg  TAKE ONE  90 capsule  0  04/10/201  04/16/20  Discontinued



 capsule,delayed  CAPSULE BY      9  19  (Reorder)



 release(DR/EC)  MOUTH EVERY          



   DAY          







Active Problems







 Problem  Noted Date

 

 Mild cognitive impairment with memory loss  2019

 

 Coronary artery disease involving native coronary artery of native heart  



 without angina pectoris  

 

 Bilateral carotid bruits  2018

 

 Memory loss or impairment  2017

 

 Nonrheumatic aortic valve insufficiency  2017

 

 Essential hypertension  2017

 

 Hyperlipidemia  2017







Encounters







 Date  Type  Specialty  Care Team  Description

 

 2019  Office Visit  Neurology  Shawn Raphael MD  Mild cognitive impairment



         with memory loss (Primary Dx)

 

 04/10/2019  Refill  Neurology  Shawn Raphael MD  

 

 2019  Refill  Neurology  Jemma Flores MA  

 

 2019  Office Visit  Cardiology  Graham Stapleton MD  Coronary artery 
disease involving native coronary artery of native heart without angina 
pectoris (Primary Dx);



         Other hyperlipidemia

 

 2019  Office Visit  Neurology  Shawn Raphael MD  Mild cognitive impairment



         with memory loss (Primary Dx)

 

 2018  Refill  Neurology  Shawn Raphael MD  Mild cognitive impairment



         with memory loss

 

 10/19/2018  Refill  Neurology  Jemma Flores,  Mild cognitive impairment



       MA  with memory loss

 

 2018  Office Visit  Neurology  Shawn Raphael MD  Mild cognitive 
impairment with memory loss (Primary Dx);



         B12 deficiency

 

 2018  Orders Only  Neurology  Jemma Flores,  Memory loss or 
impairment



       MA  



after 2018



Social History







 Tobacco Use  Types  Packs/Day  Years Used  Date

 

 Former Smoker        









 Smokeless Tobacco: Former User      









 Alcohol Use  Drinks/Week  oz/Week  Comments

 

 Yes      









 Sex Assigned at Birth  Date Recorded

 

 Not on file  









 Job Start Date  Occupation  Industry

 

 Not on file  Not on file  Not on file









 Travel History  Travel Start  Travel End









 No recent travel history available.







Last Filed Vital Signs







 Vital Sign  Reading  Time Taken  Comments

 

 Blood Pressure  128/66  2019  1:51 PM CDT  

 

 Pulse  77  2019  1:51 PM CDT  

 

 Temperature  -  -  

 

 Respiratory Rate  -  -  

 

 Oxygen Saturation  -  -  

 

 Inhaled Oxygen Concentration  -  -  

 

 Weight  83.5 kg (184 lb)  2019  1:51 PM CDT  

 

 Height  170.2 cm (5' 7")  2019  1:51 PM CDT  

 

 Body Mass Index  28.82  2019  1:51 PM CDT  







Plan of Treatment







 Date  Type  Specialty  Care Team  Description

 

 2019  Office Visit  Neurology  Shawn Raphael MD



  



       [address] [facility]



  



       [address] [place] [address]



  



       [phone] (Fax)  

 

 2020  Office Visit  Cardiology  Graham Stapleton MD



  



       [address] [facility]



  



       [address] [place] [address]



  



       [phone] (Fax)  









 Health Maintenance  Due Date  Last Done  Comments

 

 COLONOSCOPY SCREENING  2000    

 

 SHINGLES VACCINES (#1)  2000    

 

 65+ PNEUMOCOCCAL VACCINE (1 of 2 - PCV13)  2015    

 

 INFLUENZA VACCINE  2019  11/15/2018  







Procedures







 Procedure Name  Priority  Date/Time  Associated Diagnosis  Comments

 

 COPY RECEIVED FROM:  Routine  2019  9:20    Results for this



     AM CST    procedure are in



         the results



         section.

 

 NON HDL CHOLESTEROL  Routine  2019  9:20    Results for this



 ([facility])    AM CST    procedure are in



         the results



         section.

 

 CHOL/HDLC RATIO  Routine  2019  9:20    Results for this



 ([facility])    AM CST    procedure are in



         the results



         section.

 

 LDL-CHOLESTEROL  Routine  2019  9:20    Results for this



 ([facility])    AM CST    procedure are in



         the results



         section.

 

 TRIGLYCERIDES  Routine  2019  9:20    Results for this



     AM CST    procedure are in



         the results



         section.

 

 HDL CHOLESTEROL  Routine  2019  9:20    Results for this



     AM CST    procedure are in



         the results



         section.

 

 CHOLESTEROL  Routine  2019  9:20    Results for this



     AM CST    procedure are in



         the results



         section.

 

 COPY(IES) SENT TO:  Routine  2019  9:20    Results for this



     AM CST    procedure are in



         the results



         section.

 

 AST (SGOT)  Routine  2019  9:20  Coronary artery  Results for this



     AM CST  disease involving  procedure are in



       native coronary artery  the results



       of native heart  section.



       without angina  



       pectoris



  



       Other hyperlipidemia  

 

 LIPID PANEL  Routine  2019  9:20  Coronary artery  Results for this



     AM CST  disease involving  procedure are in



       native coronary artery  the results



       of native heart  section.



       without angina  



       pectoris



  



       Other hyperlipidemia  

 

 ECG 12-LEAD  Routine  2019 11:36  Coronary artery  Results for this



     AM CST  disease involving  procedure are in



       native coronary artery  the results



       of native heart  section.



       without angina  



       pectoris  

 

 VITAMIN B1 LEVEL,  Routine  2018 11:13  Biotin-(propionyl-CoA-  Results 
for this



 WHOLE BLOOD    AM CDT  carboxylase) ligase  procedure are in



       deficiency  the results



         section.

 

 VITAMIN B12 LEVEL  Routine  2018 11:13  Biotin-(propionyl-CoA-  Results 
for this



     AM CDT  carboxylase) ligase  procedure are in



       deficiency  the results



         section.



after 2018



Results

NON HDL CHOLESTEROL ([facility]) (2019  9:20 AM CST)





 Component  Value  Ref Range  Performed At  Pathologist



         Signature

 

 Non-HDL cholesterol  129  <130 mg/dL  [facility]  



   Comment:  (calc)  [place]  



   For patients with diabetes plus 1 major ASCVD risk      



   factor, treating to a non-HDL-C goal of <100 mg/dL      



   (LDL-C of <70 mg/dL) is considered a therapeutic      



   option.      



         









 Specimen

 

 









 Narrative  Performed At

 

 FASTING:YES



  [facility]



 FASTING: YES  









 Resulting Agency Comment

 

 Performing Organization Information:







 Site ID: [facility]







 Name: [facility]







 Address: [address] [place] [address]







 Director: Neris Sierra









 Performing Organization  Address  City/[facility] Cent[facility]  Phone Number

 

 [place] [address]  [phone]



CHOL/HDLC RATIO ([facility]) (2019  9:20 AM CST)





 Component  Value  Ref Range  Performed At  Pathologist Signature

 

 Cholesterol/HDL ratio  3.6  <5.0 (calc)  [facility]  



       [place]  









 Specimen

 

 









 Narrative  Performed At

 

 FASTING:YES



  [facility]



 FASTING: YES  









 Resulting Agency Comment

 

 Performing Organization Information:







 Site ID: [facility]







 Name: [facility]







 Address: [address] [place] [address]







 Director: Neris Sierra









 Performing Organization  Address  City/[facility] Cent[facility]  Phone Number

 

 [place] [address]  [phone]



COPY RECEIVED FROM: (2019  9:20 AM CST)





 Component  Value  Ref Range  Performed At  Pathologist Signature

 

 Copy received from:      [facility]  



   Comment:      



         



   [facility]       



   [address] [facility] [address]      



   [place] [address]      



         









 Specimen

 

 









 Narrative  Performed At

 

 FASTING:YES



  [facility]



 FASTING: YES  









 Performing Organization  Address  City/[facility]/Zipcode  Phone Number

 

 [facility]      



LDL-CHOLESTEROL ([facility]) (2019  9:20 AM CST)





 Component  Value  Ref Range  Performed At  Pathologist



         Signature

 

 LDL cholesterol  106 (H)  mg/dL (calc)  [facility]  



 calculated  Comment:    [place]  



   Reference range: <100      



         



   Desirable range <100 mg/dL for primary prevention;      



   <70 mg/dL for patients with CHD or diabetic patients      



   with > or=2 CHD risk factors.      



         



   LDL-C is now calculated using the Ihsan-Minor      



   calculation, which is a validated novel method providing      



   better accuracy than the Friedewald equation in the      



   estimation of LDL-C.      



   Ihsan GOYAL et al. EMIL. 2013;310(66): 6717-8526      



   (http://education.WAFU/faq/TEL850)      



         









 Specimen

 

 









 Narrative  Performed At

 

 FASTING:YES



  [facility]



 FASTING: YES  









 Resulting Agency Comment

 

 Performing Organization Information:







 Site ID: [facility]







 Name: [facility]







 Address: [address] [place] [id number]







 Director: Neris Sierra









 Performing Organization  Address  City/[facility]  Phone Number

 

 [employer] [facility] [place]  [address] [place] [address]  [phone]



COPY(IES) SENT TO: (2019  9:20 AM CST)





 Component  Value  Ref Range  Performed At  Pathologist Signature

 

 Copies/mL      [facility]  



   Comment:      



         



    [facility] [address]      



   [address] [facility] [address]      



   [place] [address]      



         









 Specimen

 

 









 Narrative  Performed At

 

 FASTING:YES



  [facility]



 FASTING: YES  









 Performing Organization  Address  City/[facility]  Phone Number

 

 [facility]      



Triglycerides (2019  9:20 AM CST)





 Component  Value  Ref Range  Performed At  Pathologist Signature

 

 Triglycerides  129  <150 mg/dL  [facility] [place]  









 Specimen

 

 









 Narrative  Performed At

 

 FASTING:YES



  [facility]



 FASTING: YES  









 Resulting Agency Comment

 

 Performing Organization Information:







 Site ID: [facility]







 Name: [facility]







 Address: [address] [place] [id number]







 Director: Neris Sierra









 Performing Organization  Address  City/State/[facility]  Phone Number

 

 [employer] [place]  [address] [place] [address] [phone]



AST (SGOT) (2019  9:20 AM CST)





 Component  Value  Ref Range  Performed At  Pathologist Signature

 

 AST  19  10 - 35 U/L  [facility] [place]  









 Specimen

 

 Blood









 Narrative  Performed At

 

 FASTING:YES



  [facility]



 FASTING: YES  









 Resulting Agency Comment

 

 Performing Organization Information:







 Site ID: [facility]







 Name: [facility]







 Address: [address] [place] [id number]







 Director: Neris Sierra









 Performing Organization  Address  City/State/[facility]  Phone Number

 

 [employer] [facility] [place]  [address] [place] [address] [phone]



HDL cholesterol (2019  9:20 AM CST)





 Component  Value  Ref Range  Performed At  Pathologist Signature

 

 HDL cholesterol  50  >40 mg/dL  [facility] [place]  









 Specimen

 

 









 Narrative  Performed At

 

 FASTING:YES



  [facility]



 FASTING: YES  









 Resulting Agency Comment

 

 Performing Organization Information:







 Site ID: [facility]







 Name: [facility]







 Address: [address] [place] [address]







 Director: Neris Sierra









 Performing Organization  Address  City/[facility] Cent[facility]ode  Phone Number

 

 [employer] [address] [address]  [phone]



Cholesterol (2019  9:20 AM CST)





 Component  Value  Ref Range  Performed At  Pathologist [facility]

 

 Cholesterol, total  179  <200 mg/dL  [facility]  









 Specimen

 

 









 Narrative  Performed At

 

 FASTING:YES



  [facility]



 FASTING: YES  









 Resulting Agency Comment

 

 Performing Organization Information:







 Site ID: [facility]







 Name: [facility]







 Address: [address] [place] [address]







 Director: Neris Sierra









 Performing Organization  Address  City/[facility] Centercode  Phone Number

 

 [employer] [address] [address]  [phone]



Lipid panel (2019  9:20 AM CST)





 Component  Value  Ref Range  Performed At  Pathologist



         [facility]

 

 Cholesterol, total  179  <200 mg/dL  [facility]  



       [place]  

 

 HDL cholesterol  50  >40 mg/dL  [facility]  



       [place]  

 

 Triglycerides  129  <150 mg/dL  [facility]  



       [place]  

 

 LDL cholesterol  106 (H)  mg/dL (calc)  [facility]  



 calculated  Comment:    [place]  



   Reference range: <100      



         



   Desirable range <100 mg/dL for primary prevention;      



   <70 mg/dL for patients with CHD or diabetic patients      



   with > or=2 CHD risk factors.      



         



   LDL-C is now calculated using the Ihsan-Iván      



   calculation, which is a validated novel method providing      



   better accuracy than the Friedewald equation in the      



   estimation of LDL-C.      



   Ihsan GOYAL et al. EMIL. 2013;310(19): 7322-7488      



   (http://education.Routehappy.[employer]/faq/TFF233)      



         

 

 Cholesterol/HDL  3.6  <5.0 (calc)  [facility]  



 [facility]  

 

 Non-HDL cholesterol  129  <130 mg/dL  [facility]  



   Comment:  (calc)  [place]  



   For patients with diabetes plus 1 major ASCVD risk      



   factor, treating to a non-HDL-C goal of <100 mg/dL      



   (LDL-C of <70 mg/dL) is considered a therapeutic      



   option.      



         









 Specimen

 

 Blood









 Narrative  Performed At

 

 FASTING:YES



  [facility]



 FASTING: YES  









 Resulting Agency Comment

 

 Performing Organization Information:







 Site ID: [facility]







 Name: [facility]







 Address: [address] [place] [address]







 Director: Neris Sierra









 Performing Organization  Address  City/[facility]/Zipcode  Phone Number

 

 [facility]      

 

 [facility] [place]  [address] [place] [address]  [phone]



ECG 12 lead (2019 11:36 AM CST)





 Component  Value  Ref Range  Performed At  Pathologist Signature

 

 Ventricular rate  60    [facility]  

 

 Atrial rate  60    [facility]  

 

 VT interval  154    [facility]  

 

 QRSD interval  84    [facility]  

 

 QT interval  382    [facility]  

 

 QTC interval  382    [facility]  

 

 P axis 1  71    [facility]  

 

 QRS axis 1  11    [facility]  

 

 T wave axis  42    [facility]  

 

 EKG impression  Normal sinus    [facility]  



   rhythm-Normal ECG-In      



   automated comparison      



   with ECG of 2018      



   09:37,-No significant      



   change was      



   found-Electronically      



   Signed By Doc Garcia MD ([id number]) on      



   2019 8:26:12 PM      









 Specimen

 

 









 Narrative  Performed At

 

 This result has an attachment that is not available.



  









 Performing Organization  Address  City/[facility] Cent[facility]  Phone Number

 

 [facility]  [address] [place] [address]  



Vitamin B1 level, whole blood (2018 11:13 AM CDT)





 Component  Value  Ref Range  Performed At  Pathologist



         Signature

 

 Vitamin B1  112  70 - 180  [facility]  



   Comment:  nmol/L    



   INTERPRETIVE INFORMATION: Vitamin B1, Whole Blood      



   This assay measures the concentration of thiamine diphosphate      



   (TDP), the primary active form of vitamin B1. Approximately 90      



   percent of vitamin B1 present in whole blood is TDP. Thiamine and      



   thiamine monophosphate, which comprise the remaining 10 percent,      



   are not measured.      



   Test developed and characteristics determined by [facility]. See Compliance Statement B: [employer]ty/CS      



   Performed by [facility],
      



   [address] [place], [place] [id number] [phone]
      



   [email or web address], Roberto Andrade MD - Lab. Director      



         









 Specimen

 

 Plasma specimen









 Performing Organization  Address  City/[facility]/Zipcode  Phone Number

 

 [id number]  [address] [place] [address]  



Vitamin B12 level (2018 11:13 AM CDT)





 Component  Value  Ref Range  Performed At  Pathologist



         Signature

 

 Vitamin B12  1,060 (H)  493 - 313  [facility] DEPARTMENT OF  



   Comment:  pg/mL  PATHOLOGY AND  



   Significant overlap exists between normal and deficiency states.    GENOMIC 
MEDICINE  



   However, most patients with deficiencies will have Serum B12      



   <200 pg/mL.
      



         









 Specimen

 

 Serum









 Performing Organization  Address  City/State/Zipcode  Phone Number

 

 [facility] DEPARTMENT OF PATHOLOGY AND  [address] [place] [address]  



 GENOMIC MEDICINE      



after 2018



Insurance







 Payer  Benefit Plan / Group  Subscriber ID  Effective Dates  Phone  Address  
Type

 

 [employer] MEDICARE  [employer] MEDICARE  xxxxxxxx  2017-Present      HMO



   HMO/O [facility]          









 Guarantor Name  Account Type  Relation to  Date of  Phone  Billing Address



     Patient  Birth    

 

 Fabio Matute  Personal/Family  Self  1950  [phone]  [address]







         (Home)  [place] [address]







Advance Directives

For more information, please contact: [phone]





 Type  Date Recorded  Patient Representative  Explanation

 

 Advance Directives, Living Will and      



 Medical Power of

## 2019-09-06 NOTE — EDPHYS
Physician Documentation                                                                           

 [facility]                                                                 

Name: Fabio Matute Jr                                                                                

Age: 69 yrs                                                                                       

Sex: Male                                                                                         

: 1950                                                                                   

MRN: [id number]                                                                                   

Arrival Date: 2019                                                                          

Time: 09:35                                                                                       

Account#: [id number]                                                                            

Bed 12                                                                                            

Private MD: Laz Odonnell B                                                                     

ED Physician Chapin Carver                                                                      

HPI:                                                                                              

                                                                                             

09:50 This 69 yrs old  Male presents to ER via Ambulatory with complaints of Suture  snw 

      Removal.                                                                                    

09:50 The patient has sutures on the lateral aspect of right calf. Previous treatment: The    snw 

      patient was initially treated 10 day(s) ago. Sutures/staples progress: The patient has      

      no c/o's. The wound is well-healing with no redness, swelling, discharge, or dehiscence     

      reported. The patient has not experienced similar symptoms in the past. as noted.           

                                                                                                  

Historical:                                                                                       

- Allergies:                                                                                      

09:41 No Known Allergies;                                                                     iw  

                                                                                                  

                                                                                                  

                                                                                                  

ROS:                                                                                              

09:49 Constitutional: Negative for fever, chills, and weight loss, Eyes: Negative for injury, snw 

      pain, redness, and discharge, ENT: Negative for injury, pain, and discharge, Neck:          

      Negative for injury, pain, and swelling, Cardiovascular: Negative for chest pain,           

      palpitations, and edema, Respiratory: Negative for shortness of breath, cough,              

      wheezing, and pleuritic chest pain, Abdomen/GI: Negative for abdominal pain, nausea,        

      vomiting, diarrhea, and constipation, Back: Negative for injury and pain, : Negative      

      for injury, bleeding, discharge, and swelling, MS/Extremity: Negative for injury and        

      deformity, Neuro: Negative for headache, weakness, numbness, tingling, and seizure,         

      Psych: Negative for depression, anxiety, suicide ideation, homicidal ideation, and          

      hallucinations, Allergy/Immunology: Negative for hives, rash, and allergies.                

09:49 Skin: Positive for suture removal from laceration repaired 10 days ago.                     

                                                                                                  

Exam:                                                                                             

09:48 Constitutional:  This is a well developed, well nourished patient who is awake, alert,  snw 

      and in no acute distress. Head/Face:  Normocephalic, atraumatic. Eyes:  Pupils equal        

      round and reactive to light, extra-ocular motions intact.  Lids and lashes normal.          

      Conjunctiva and sclera are non-icteric and not injected.  Cornea within normal limits.      

      Periorbital areas with no swelling, redness, or edema. ENT:  Nares patent. No nasal         

      discharge, no septal abnormalities noted.  Tympanic membranes are normal and external       

      auditory canals are clear.  Oropharynx with no redness, swelling, or masses, exudates,      

      or evidence of obstruction, uvula midline.  Mucous membranes moist. Neck:  Trachea          

      midline, no thyromegaly or masses palpated, and no cervical lymphadenopathy.  Supple,       

      full range of motion without nuchal rigidity, or vertebral point tenderness.  No            

      Meningismus. Chest/axilla:  Normal chest wall appearance and motion.  Nontender with no     

      deformity.  No lesions are appreciated. Cardiovascular:  Regular rate and rhythm with a     

      normal S1 and S2.  No gallops, murmurs, or rubs.  Normal PMI, no JVD.  No pulse             

      deficits. Respiratory:  Lungs have equal breath sounds bilaterally, clear to                

      auscultation and percussion.  No rales, rhonchi or wheezes noted.  No increased work of     

      breathing, no retractions or nasal flaring. Abdomen/GI:  Soft, non-tender, with normal      

      bowel sounds.  No distension or tympany.  No guarding or rebound.  No evidence of           

      tenderness throughout. Back:  No spinal tenderness.  No costovertebral tenderness.          

      Full range of motion. MS/ Extremity:  Pulses equal, no cyanosis.  Neurovascular intact.     

       Full, normal range of motion. Neuro:  Awake and alert, GCS 15, oriented to person,         

      place, time, and situation.  Cranial nerves II-XII grossly intact.  Motor strength 5/5      

      in all extremities.  Sensory grossly intact.  Cerebellar exam normal.  Normal gait.         

      Psych:  Awake, alert, with orientation to person, place and time.  Behavior, mood, and      

      affect are within normal limits.                                                            

09:48 Skin: Appearance: normal except for affected area, three sutures removed from right         

      lateral leg, wound edges well approximated. Pt tolerated well.                              

09:48 Neuro: Exam negative for acute changes.                                                     

                                                                                                  

Vital Signs:                                                                                      

09:40  / 86; Pulse 67; Resp 16; Temp 97.6; Pulse Ox 94% on R/A; Weight 81.65 kg; Height iw  

      5 ft. 7 in. (170.18 cm);                                                                    

09:40 Body Mass Index 28.19 (81.65 kg, 170.18 cm)                                             iw  

                                                                                                  

MDM:                                                                                              

09:37 Patient medically screened.                                                             [facility] 

09:55 Data reviewed: vital signs, nurses notes. Data interpreted: Pulse oximetry: on room air snw 

      is 94 %. Interpretation: acceptable. Counseling: I had a detailed discussion with the       

      patient and/or guardian regarding: the historical points, exam findings, and any            

      diagnostic results supporting the discharge/admit diagnosis, the need for outpatient        

      follow up, to return to the emergency department if symptoms worsen or persist or if        

      there are any questions or concerns that arise at home. Special discussion: Based on        

      the history and exam findings, there is no indication for further emergent testing or       

      inpatient evaluation. I discussed with the patient/guardian the need to see the primary     

      care provider for further evaluation of the symptoms.                                       

                                                                                                  

                                                                                             

09:55 Order name: Wound Care; Complete Time: 10:04                                            snw 

                                                                                             

09:55 Order name: Wound dressing; Complete Time: 10:05                                        snw 

                                                                                                  

Administered Medications:                                                                         

No medications were administered                                                                  

                                                                                                  

                                                                                                  

Disposition:                                                                                      

12:03 Co-signature as Attending Physician, Chapin Carver MD I agree with the assessment and  malissa 

      plan of care. PA/NP's history reviewed, patient interviewed, and examined.                  

                                                                                                  

Disposition:                                                                                      

19 09:54 Discharged to Home. Impression: Encounter for removal of sutures.                  

- Condition is Stable.                                                                            

- Discharge Instructions: Suture Removal, Care After, Sutured Wound Care.                         

                                                                                                  

- Medication Reconciliation Form, Thank You Letter, Antibiotic Education, Prescription            

  Opioid Use form.                                                                                

- Follow up: Laz Odonnell; When: 1 week; Reason: Recheck today's complaints,                     

  Continuance of care, Re-evaluation by your physician. Follow up: Emergency                      

  Department; When: As needed; Reason: Worsening of condition.                                    

                                                                                                  

                                                                                                  

                                                                                                  

Signatures:                                                                                       

Chapin Carver MD MD cha Therrien, Shelly, FNP-C                 FNP-Csnw                                                  

Donna Philip RN                     RN   iw                                                   

                                                                                                  

Corrections: (The following items were deleted from the chart)                                    

10:05 09:54 2019 09:54 Discharged to Home. Impression: Encounter for removal of         iw  

      sutures. Condition is Stable. Discharge Instructions: Suture Removal, Care After,           

      Sutured Wound Care. Forms are Medication Reconciliation Form, Thank You Letter,             

      Antibiotic Education, Prescription Opioid Use. Follow up: Laz Odonnell; When: 1 week;      

      Reason: Recheck today's complaints, Continuance of care, Re-evaluation by your              

      physician. Follow up: Emergency Department; When: As needed; Reason: Worsening of           

      condition. snw                                                                              

                                                                                                  

**************************************************************************************************